# Patient Record
Sex: FEMALE | Race: WHITE
[De-identification: names, ages, dates, MRNs, and addresses within clinical notes are randomized per-mention and may not be internally consistent; named-entity substitution may affect disease eponyms.]

---

## 2020-06-21 ENCOUNTER — HOSPITAL ENCOUNTER (EMERGENCY)
Dept: HOSPITAL 88 - ER | Age: 70
Discharge: HOME | End: 2020-06-21
Payer: MEDICARE

## 2020-06-21 VITALS — HEIGHT: 66 IN | BODY MASS INDEX: 30.37 KG/M2 | WEIGHT: 189 LBS
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
## 2020-06-21 DIAGNOSIS — K21.9: ICD-10-CM
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
## 2020-06-21 DIAGNOSIS — G89.29: ICD-10-CM
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
## 2020-06-21 DIAGNOSIS — S01.112A: Primary | ICD-10-CM

## 2020-06-21 DIAGNOSIS — M54.9: ICD-10-CM

## 2020-06-21 PROCEDURE — 72131 CT LUMBAR SPINE W/O DYE: CPT

## 2020-06-21 PROCEDURE — 99284 EMERGENCY DEPT VISIT MOD MDM: CPT

## 2020-06-21 PROCEDURE — 70450 CT HEAD/BRAIN W/O DYE: CPT

## 2020-06-21 PROCEDURE — 70486 CT MAXILLOFACIAL W/O DYE: CPT

## 2020-06-21 PROCEDURE — 72125 CT NECK SPINE W/O DYE: CPT

## 2020-06-21 NOTE — EMERGENCY DEPARTMENT NOTE
History of Present Illnes


History of Present Illness


Chief Complaint:  Laceration


History of Present Illness


This is a 70 year old  female PATIENT IN FROM [facility]; STATES WAS WALKIN

G TO HER CAR AND MISSED A STEP, TRIPPED AND FELL, HITTING HER HEAD ON THE 

CONCRETE.  DENIES LOC, NAUSEA, VOMITING, OR DIZZINESS..


Historian:  Patient


Arrival Mode:  Car


 Required:  No


Onset (how long ago):  minute(s)


Location:  LEFT PERIORBITAL AREA


Quality:  PAIN


Radiation:  Reports non-radiation


Severity:  mild


Onset quality:  sudden


Timing of current episode:  constant


Progression:  unchanged


Chronicity:  new


Context:  Denies recent illness


Relieving factors:  none


Exacerbating factors:  none


Associated symptoms:  Reports denies other symptoms


Treatments prior to arrival:  none





Past Medical/Family History


Physician Review


I have reviewed the patient's past medical and family history.  Any updates have

been documented here.





Past Medical History


Recent Fever:  No


Clinical Suspicion of Infectio:  No


New/Unexplained Change in Ment:  No


Past Medical History:  GERD, Chronic Back Pain


Other Medical History:  


CHRONIC BRONCHITIS





GI ULCERS





GI BLEEDING





SEASONAL ALLERGIES


Other Surgery:  


BILAT CARPAL TUNNEL





Social History


Smoking Cessation:  Never Smoker


Counseling Performed:  No


Alcohol Use:  None


Any Illegal Drug Use:  No


TB Exposure/Symptoms:  No


Physically hurt or threatened:  No





Family History


Family history of heart diseas:  No





Other


Last Tetanus:  UTD


Any Pre-Existing Lines (PICC,:  No





Review of Systems


Review of Systems


Constitutional:  Reports no symptoms


EENTM:  Reports as per HPI


Cardiovascular:  Reports no symptoms


Respiratory:  Reports no symptoms


Gastrointestinal:  Reports no symptoms


Genitourinary:  Reports no symptoms


Musculoskeletal:  Reports back pain (CHRONIC)


Integumentary:  Reports no symptoms


Neurological:  Reports no symptoms


Psychological:  Reports no symptoms


Endocrine:  Reports no symptoms


Hematological/Lymphatic:  Reports no symptoms





Physical Exam


Related Data


Allergies:  


Coded Allergies:  


     Penicillins (Verified  Allergy, Mild, 6/21/20)


     cephalexin (Verified  Allergy, Mild, 6/21/20)


     codeine (Verified  Allergy, Mild, 6/21/20)


     prednisone (Verified  Allergy, Mild, 6/21/20)


Triage Vital Signs





Vital Signs








  Date Time  Temp Pulse Resp B/P (MAP) Pulse Ox O2 Delivery O2 Flow Rate FiO2


 


6/21/20 10:39 96.3 76 20 175/72 97   








Vital signs reviewed:  Yes





Physical Exam


CONSTITUTIONAL





Constitutional:  Present well-developed, Present well-nourished


HENT


HENT:  Present oropharynx clear/moist, Present oropharynx normal


HENT L/R:  Present left ext ear normal, Present right ext ear normal


EYES





Eyes:  Reports PERRL, Reports conjunctivae normal, Reports EOM normal, Reports 

other (SUPRAORBITAL/UPPER LID ECCHYMOSIS, 1.5 CM LINEAR SUPERFICIAL LAC SUPERIOR

AND SLIGHTLY LATERAL TO LEFT EYE, ABRASION LATERAL TO LEFT EYE ON UPPER CHEEK, 

AND PUNCTATE ABRASION TO LEFT UPPER LID); 


   Denies left eye discharge, Denies right eye discharge


NECK


Neck:  Present ROM normal


PULMONARY


Pulmonary:  Present effort normal, Present breath sounds normal


CARDIOVASCULAR





Cardiovascular:  Present regular rhythm, Present heart sounds normal, Present 

capillary refill normal, Present normal rate


GASTROINTESTINAL





Abdominal:  Present soft, Present nontender, Present bowel sounds normal


GENITOURINARY





Genitourinary:  Present exam deferred


SKIN


Skin:  Present warm, Present dry


MUSCULOSKELETAL





Musculoskeletal:  Present ROM normal


NEUROLOGICAL





Neurological:  Present alert, Present oriented x 3, Present no gross motor or 

sensory deficits


PSYCHOLOGICAL


Psychological:  Present mood/affect normal, Present judgement normal





Results


Imaging


Imaging results reviewed:  Yes


Impressions


CT BRAIN


Impression:





1.  Acute left periorbital superficial hematoma.


2.  No fractures.


3.  No acute intracranial abnormalities.


4.  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


horns and mild dilatation of the rest of the ventricles. No acute hydrocephalus





Signed by: Dr. Og Thomas M.D. on 6/21/2020 12:39 PM








CT/CT MAXIO FAC/PARANAS WO


IMPRESSION: 





1.  Acute left periorbital superficial hematoma.


2.  No fractures.


3.  No additional acute maxillofacial abnormalities.


4.  Please refer to the head and cervical spine CTs obtained at the same time.





Signed by: Dr. Og Thomas M.D. on 6/21/2020 12:42 PM





CT/CT CERVICAL SPINE WO


IMPRESSION:





1.  No acute abnormalities.





2.  Bones demineralized but no fractures





3.  Cannot adequately evaluate for ligament, spinal cord and or vascular


abnormalities.





4.  Degenerative changes as described.





Signed by: Dr. Og Thomas M.D. on 6/21/2020 1:06 PM











CT/CT LUMBAR SPINE WO


IMPRESSION:





1.  Bones moderately demineralized.


2.  No fractures.


3.  Cannot adequately evaluate for conus, cauda equina, ligament or vascular


injuries.





Chronic degenerative changes:


*  Degenerated discs, worst right from L2 to L3 and left from L3 to L5 due to a


left curvature at L1.


*  Severe degenerative spinal canal stenosis at L4-5.


*  Superimposed foraminal stenosis from L1-S1 is worst bilaterally at L1-L2, on


the right at L3-4 and bilaterally at L4-5 and at L5-S1.


*  No disc herniations





Signed by: Dr. Og Thomas M.D. on 6/21/2020 1:05 PM





Procedures


Laceration


Laceration:  Laceration 1


Site:  face (LATERAL AND SUPERIOR TO LEFT EYE)


Side:  left


Size (cm):  1.5


Description:  linear


Depth:  simple, single layer


Amount of anesthesia (mL):  0


Skin layer closed with:  other (SURGICAL GLUE)


Number of sutures:  0





Assessment & Plan


Medical Decision Making


MDM


MECHANICAL FALL, LAC TO FACE, LEFT PERIORBITAL SWELLING AND ECCHYMOSIS - CHECK 

CT FACE, HEAD, C-SPINE. SHE ALSO C/O CHRONIC LBP WITH SCIATICA AND REQUESTS 

IMAGING - WILL GET CT L SPINE





Reassessment


Reassessment


DC HOME, DERMABOND INSTRUCTIONS, F/U PCP TOMORROW





Assessment & Plan


Final Impression:  


(1) Fall


(2) Laceration of face


(3) Contusion, periorbital


Depart Disposition:  HOME, SELF-CARE


Last Vital Signs











  Date Time  Temp Pulse Resp B/P (MAP) Pulse Ox O2 Delivery O2 Flow Rate FiO2


 


6/21/20 10:39 96.3 76 20 175/72 97   

















DAXA PONCE MD               Jun 21, 2020 12:17

## 2020-06-21 NOTE — DIAGNOSTIC IMAGING REPORT
History: Fall

Comparison studies: None



Technique: 

Axial images were obtained from inferior T9 through the sacrum..

Coronal and sagittal images reconstructed from the axial data.

Dose modulation, iterative reconstruction, and/or weight based adjustment 

of the mA/kV was utilized to reduce the radiation dose to as low as reasonably 

achievable.



Intravenous contrast: None



Findings:



Number of non-rib bearing vertebral bodies: 5



Alignment:

Left curvature centered at L1 is associated with 2 mm retrolisthesis of L1 on

L2, 

2 mm anterolisthesis of L3 on L4 and 4 mm anterolisthesis of L4 on L5.



Soft tissues: No abnormalities.

Paraspinal muscles: Unremarkable.



Vertebrae:  

Bones moderately demineralized.

No fractures, infection or neoplasm.



Degenerative changes:



T12-L1:

Moderately degenerated disc worse on the right due to the curvature.

Patent spinal canal and foramina. No disc herniation.



L1-L2:

Moderately degenerated disc, worse on the right due to the curvature.

Moderate bilateral foraminal stenosis due to endplate osteophytes and a

retrolisthesis of L1 on L2.

Patent spinal canal. No disc herniation.



L2-L3: Mildly degenerated disc.

Mild bilateral foraminal stenosis due to a disc bulge and endplate osteophytes.

Patent bilateral canal. No disc herniation.



L3-L4:

Mildly degenerated disc, worse left due to the curvature.

Mild spinal canal stenosis and foraminal stenosis, moderate right, mild left,

due to a disc bulge, endplate osteophytes and facet arthrosis. No disc

herniation.



L4-L5:

Moderately degenerated disc, worse on the left due to the curvature.

Severe spinal canal stenosis and foraminal stenosis, moderate right, severe

left is due to an asymmetric disc bulge, endplate osteophytes and facet

arthrosis and related to the curvature. No disc herniation.



L5-S1:

Moderately degenerated disc.

Mild spinal canal stenosis but severe bilateral foraminal stenosis is due to a

disc bulge, endplate osteophytes and facet arthrosis. No disc herniation.



Sacroiliac joints: 

Mildly degenerated bilaterally.



IMPRESSION:



1.  Bones moderately demineralized.

2.  No fractures.

3.  Cannot adequately evaluate for conus, cauda equina, ligament or vascular

injuries.



Chronic degenerative changes:

*  Degenerated discs, worst right from L2 to L3 and left from L3 to L5 due to a

left curvature at L1.

*  Severe degenerative spinal canal stenosis at L4-5.

*  Superimposed foraminal stenosis from L1-S1 is worst bilaterally at L1-L2, on

the right at L3-4 and bilaterally at L4-5 and at L5-S1.

*  No disc herniations



Signed by: Dr. Og Thomas M.D. on 6/21/2020 1:05 PM

## 2020-06-21 NOTE — DIAGNOSTIC IMAGING REPORT
History: Fall

Comparison studies: None



Technique: 

Axial images were obtained through the cervical region..

Coronal and sagittal images reconstructed from the axial data.

Dose modulation, iterative reconstruction, and/or weight based adjustment 

of the mA/kV was utilized to reduce the radiation dose to as low as reasonably 

achievable.



Intravenous contrast: None



Findings:



Fractures: None.

Soft tissues: No gross abnormalities.



Alignment: 

Reversal of the usual lordosis, centered at C4, is associated with 

3 mm retrolisthesis of C4 on C5.

No scoliosis.



Cervicomedullary junction: 

No abnormalities. The foramen magnum is patent.



Vertebrae: 

Bones moderately demineralized.

No infection or neoplasm.



Degenerative changes: 

*  Moderately degenerated discs from C3 through T2.

*  Bilateral facet arthrosis, worse on the left at C3-3 and T3-4

*  Foraminal stenosis from C2 through T1 is worse (moderate) on the left from

C2 C4, on the right at C4-5 and on the left at C5-6 and C6-7 C7 due to facet

and uncovertebral arthrosis.

*  Mild spinal canal stenosis at C3-4, moderate at C4-5 due to disc osteophyte

complexes.



IMPRESSION:



1.  No acute abnormalities.



2.  Bones demineralized but no fractures



3.  Cannot adequately evaluate for ligament, spinal cord and or vascular

abnormalities.



4.  Degenerative changes as described.



Signed by: Dr. Og Thomas M.D. on 6/21/2020 1:06 PM

## 2020-06-21 NOTE — DIAGNOSTIC IMAGING REPORT
History:Fall



Comparison studies:

None



Technique:

Axial images were obtained from the skull base to the vertex.

Coronal and sagittal images reconstructed from the axial data.

Dose modulation, iterative reconstruction, and/or weight based adjustment 

of the mA/kV was utilized to reduce the radiation dose to as low as reasonably 

achievable.



Intravenous contrast: None



Findings:



Scalp/skull: 

An acute left periorbital superficial hematoma is not associated with 

subcutaneous emphysema or with hyperdense foreign bodies.

No underlying fractures. 



Extra-axial spaces: 

No masses.  No fluid collections.



Brain sulci: 

Mildly prominent.



Ventricles: 

Disproportionate dilatation of the atria and occipital horns, is associated

with decreased parieto-occipital white matter is the result of congenital

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 The rest of

the ventricular system is mildly dilated but there is no acute hydrocephalus.



Parenchyma: 

No abnormal densities

No masses, hemorrhage, acute or chronic cortical vascular insults.



Sellar/suprasellar region: No abnormalities.

Craniocervical junction: Patent foramen magnum.  No Chiari one malformation.



Impression:



1.  Acute left periorbital superficial hematoma.

2.  No fractures.

3.  No acute intracranial abnormalities.

4.  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

horns and mild dilatation of the rest of the ventricles. No acute hydrocephalus



Signed by: Dr. Og Thomas M.D. on 6/21/2020 12:39 PM

## 2020-06-21 NOTE — DIAGNOSTIC IMAGING REPORT
History:Trauma

Comparison studies: None



Technique:

Axial images were obtained through the maxillofacial region.

Coronal and sagittal images reconstructed from the axial data.

Dose modulation, iterative reconstruction, and/or weight based adjustment 

of the mA/kV was utilized to reduce the radiation dose to as low as reasonably 

achievable.



Intravenous contrast: None



Findings:



Soft tissues:

An acute left periorbital superficial hematoma is associated with a punctate

superficial air focus consistent with a superficial laceration but no

hyperdense foreign bodies.



Bones: 

No fractures or bone abnormalities.



Orbits: 

Globes: Intact

Extra or intraconal abnormalities: None.



Paranasal sinuses:

 Clear



IMPRESSION: 



1.  Acute left periorbital superficial hematoma.

2.  No fractures.

3.  No additional acute maxillofacial abnormalities.

4.  Please refer to the head and cervical spine CTs obtained at the same time.



Signed by: Dr. Og Thomas M.D. on 6/21/2020 12:42 PM

## 2022-01-28 LAB
BASOPHILS # BLD AUTO: 0.1 10*3/UL (ref 0–0.1)
BASOPHILS NFR BLD AUTO: 0.7 % (ref 0–1)
DEPRECATED NEUTROPHILS # BLD AUTO: 6.9 10*3/UL (ref 2.1–6.9)
EOSINOPHIL # BLD AUTO: 0.1 10*3/UL (ref 0–0.4)
EOSINOPHIL NFR BLD AUTO: 1.6 % (ref 0–6)
ERYTHROCYTE [DISTWIDTH] IN CORD BLOOD: 12.3 % (ref 11.7–14.4)
HCT VFR BLD AUTO: 36.9 % (ref 34.2–44.1)
HGB BLD-MCNC: 11.7 G/DL (ref 12–16)
LYMPHOCYTES # BLD: 0.9 10*3/UL (ref 1–3.2)
LYMPHOCYTES NFR BLD AUTO: 10.4 % (ref 18–39.1)
MCH RBC QN AUTO: 30.4 PG (ref 28–32)
MCHC RBC AUTO-ENTMCNC: 31.7 G/DL (ref 31–35)
MCV RBC AUTO: 95.8 FL (ref 81–99)
MONOCYTES # BLD AUTO: 0.8 10*3/UL (ref 0.2–0.8)
MONOCYTES NFR BLD AUTO: 9.5 % (ref 4.4–11.3)
NEUTS SEG NFR BLD AUTO: 77.3 % (ref 38.7–80)
PLATELET # BLD AUTO: 226 X10E3/UL (ref 140–360)
RBC # BLD AUTO: 3.85 X10E6/UL (ref 3.6–5.1)

## 2022-01-31 ENCOUNTER — HOSPITAL ENCOUNTER (OUTPATIENT)
Dept: HOSPITAL 88 - OR | Age: 72
Discharge: HOME | End: 2022-01-31
Attending: INTERNAL MEDICINE
Payer: MEDICARE

## 2022-01-31 VITALS — DIASTOLIC BLOOD PRESSURE: 79 MMHG | SYSTOLIC BLOOD PRESSURE: 137 MMHG

## 2022-01-31 DIAGNOSIS — K21.9: ICD-10-CM

## 2022-01-31 DIAGNOSIS — Z01.810: ICD-10-CM

## 2022-01-31 DIAGNOSIS — K64.8: ICD-10-CM

## 2022-01-31 DIAGNOSIS — K44.9: ICD-10-CM

## 2022-01-31 DIAGNOSIS — J45.909: ICD-10-CM

## 2022-01-31 DIAGNOSIS — E78.5: ICD-10-CM

## 2022-01-31 DIAGNOSIS — R63.4: ICD-10-CM

## 2022-01-31 DIAGNOSIS — Z20.822: ICD-10-CM

## 2022-01-31 DIAGNOSIS — Z01.812: ICD-10-CM

## 2022-01-31 DIAGNOSIS — K28.9: ICD-10-CM

## 2022-01-31 DIAGNOSIS — Z79.899: ICD-10-CM

## 2022-01-31 DIAGNOSIS — Z88.8: ICD-10-CM

## 2022-01-31 DIAGNOSIS — K62.89: ICD-10-CM

## 2022-01-31 DIAGNOSIS — Z88.1: ICD-10-CM

## 2022-01-31 DIAGNOSIS — M54.9: ICD-10-CM

## 2022-01-31 DIAGNOSIS — Z88.0: ICD-10-CM

## 2022-01-31 DIAGNOSIS — Z86.010: ICD-10-CM

## 2022-01-31 DIAGNOSIS — Z86.19: ICD-10-CM

## 2022-01-31 DIAGNOSIS — K57.30: ICD-10-CM

## 2022-01-31 DIAGNOSIS — Z88.6: ICD-10-CM

## 2022-01-31 DIAGNOSIS — K52.9: Primary | ICD-10-CM

## 2022-01-31 DIAGNOSIS — M06.9: ICD-10-CM

## 2022-01-31 LAB — LACTOFERRIN STL QL: NEGATIVE

## 2022-01-31 PROCEDURE — 83993 ASSAY FOR CALPROTECTIN FECAL: CPT

## 2022-01-31 PROCEDURE — 87177 OVA AND PARASITES SMEARS: CPT

## 2022-01-31 PROCEDURE — 87045 FECES CULTURE AEROBIC BACT: CPT

## 2022-01-31 PROCEDURE — 45380 COLONOSCOPY AND BIOPSY: CPT

## 2022-01-31 PROCEDURE — 85025 COMPLETE CBC W/AUTO DIFF WBC: CPT

## 2022-01-31 PROCEDURE — 83630 LACTOFERRIN FECAL (QUAL): CPT

## 2022-01-31 PROCEDURE — 45378 DIAGNOSTIC COLONOSCOPY: CPT

## 2022-01-31 PROCEDURE — 87493 C DIFF AMPLIFIED PROBE: CPT

## 2022-01-31 PROCEDURE — 93005 ELECTROCARDIOGRAM TRACING: CPT

## 2022-01-31 PROCEDURE — 36415 COLL VENOUS BLD VENIPUNCTURE: CPT

## 2022-01-31 PROCEDURE — 87328 CRYPTOSPORIDIUM AG IA: CPT

## 2022-02-01 LAB — C DIFFICILE TOXIN A&B AMP PROB: NEGATIVE
